# Patient Record
(demographics unavailable — no encounter records)

---

## 2024-12-09 NOTE — DISCUSSION/SUMMARY
[FreeTextEntry1] : Sandra has a history of mitral valve prolapse with mild MR. She has not displayed any clear evidence of LV or RV dysfunction, though years ago there was a suspicion for RV dysfunction based on MRI.  She was comprehensively evaluated, including with an opinion at [facility], and was told that she did not have RV dysplasia. In the end, she has done well with her defibrillator, which has never fired.  Echocardiography has revealed stable bileaflet prolapse with mild to moderate MR. Echocardiography was performed in August 2022.  Ejection fraction was 60%, with moderate mitral regurgitation.  Echocardiography was performed October 27, 2023.  This revealed an ejection fraction of 61% with a right ventricular aneurysm.  There was bileaflet prolapse with mild to moderate late systolic MR.  I would not make any changes to her medications at present.  She will schedule a follow-up echocardiogram to reassess the degree of mitral regurgitation.  I will be in contact with her to discuss the results.  We discussed the possibility of a stress test, but I think her dyspnea is likely functional.  I have requested her most recent blood work, and her most recent defibrillator interrogation for my review.  If all is well, she will see me in 1 year. [EKG obtained to assist in diagnosis and management of assessed problem(s)] : EKG obtained to assist in diagnosis and management of assessed problem(s)

## 2024-12-09 NOTE — REASON FOR VISIT
[Follow-Up - Clinic] : a clinic follow-up of [Cardiomyopathy] : cardiomyopathy [Mitral Regurgitation] : mitral regurgitation [Ventricular Tachycardia] : ventricular tachycardia [FreeTextEntry1] : Initiated at 9:35a

## 2024-12-09 NOTE — END OF VISIT
Visit Information Date & Time Provider Department Dept. Phone Encounter #  
 9/18/2017  9:15 AM Dolores Haji MD [facility] [phone] [id number] Your Appointments 1/4/2018  9:30 AM  
PHYSICAL with Dolores Haji MD  
[facility]) Appt Note: [facility] 4 yr  
 [address] [place] [id number]  
[phone] [address] Upcoming Health Maintenance Date Due INFLUENZA PEDS 6M-8Y (1 of 2) 8/1/2017 Varicella Peds Age 1-18 (2 of 2 - 2 Dose Childhood Series) 12/20/2017 IPV Peds Age 0-18 (4 of 4 - All-IPV Series) 12/20/2017 MMR Peds Age 1-18 (2 of 2) 12/20/2017 DTaP/Tdap/Td series (5 - DTaP) 12/20/2017 MCV through Age 25 (1 of 2) 12/20/2024 Allergies as of 9/18/2017  Review Complete On: 9/18/2017 By: Dolores Haji MD  
  
 Severity Noted Reaction Type Reactions Egg High 09/18/2017    Anaphylaxis Nut - Unspecified High 09/18/2017    Anaphylaxis Wheat High 09/18/2017    Anaphylaxis Current Immunizations  Reviewed on 6/19/2014 Name Date DTaP 3/20/2015, 6/19/2014, 4/29/2014 DTaP-Hep B-IPV 2/20/2014 Hep A Vaccine 2 Dose Schedule (Ped/Adol) 6/30/2015, 12/23/2014 Hep B Vaccine 2013 Hep B, Adol/Ped 9/23/2014 Hib (PRP-T) 3/20/2015, 6/19/2014, 4/29/2014, 2/20/2014 IPV 6/19/2014, 4/29/2014 MMR 12/23/2014 Pneumococcal Conjugate (PCV-13) 12/23/2014, 6/19/2014, 4/29/2014, 2/20/2014 Rotavirus, Live, Pentavalent Vaccine 6/19/2014, 4/29/2014, 2/20/2014 Varicella Virus Vaccine 12/23/2014 Not reviewed this visit You Were Diagnosed With   
  
 Codes Comments Bacterial skin infection of upper extremity    -  Primary ICD-10-CM: L08.9 ICD-9-CM: 686.9 Eczema, unspecified type     ICD-10-CM: L30.9 ICD-9-CM: 692.9 Nut allergy     ICD-10-CM: Z91.018 
ICD-9-CM: V15.05 Vitals Pulse Temp Height(growth percentile) Weight(growth percentile) SpO2 BMI  
 105 97.9 °F (36.6 °C) (Axillary) (!) 3' 0.25\" (0.921 m) (2 %, Z= -2.07)* 30 lb 12.8 oz (14 kg) (15 %, Z= -1.06)* 95% 16.48 kg/m2 (74 %, Z= 0.64)* Smoking Status Never Smoker *Growth percentiles are based on [facility] 2-20 Years data. Vitals History BMI and BSA Data Body Mass Index Body Surface Area  
 16.48 kg/m 2 0.6 m 2 Preferred Pharmacy Pharmacy Name Phone [address] [facility] AT [address] [place] [address]. [phone] Your Updated Medication List  
  
   
This list is accurate as of: 9/18/17 10:19 AM.  Always use your most recent med list.  
  
  
  
  
 augmented betamethasone dipropionate 0.05 % ointment Commonly known as:  DIPROLENE-AF  
0 Refills  
  
 cetirizine 1 mg/mL solution Commonly known as:  ZYRTEC  
GIVE \"WALESKA\" 2.5 MLS BY MOUTH DAILY doxepin 10 mg/mL solution Commonly known as:  SINEQUAN Take 10 mg by mouth nightly. EPINEPHrine 0.15 mg/0.3 mL injection Commonly known as:  EPIPEN JR  
0.3 mL by IntraMUSCular route once as needed for up to 1 dose. Indications: wheat, egs and all nuts  
  
 fluocinoNIDE 0.05 % ointment Commonly known as:  LIDEX  
  
 fluticasone 0.005 % ointment Commonly known as:  Manjit Serrano Apply  to affected area daily. apply thin layer as directed  
  
 hydrocortisone 2.5 % topical cream  
Commonly known as:  HYTONE  
  
 sulfamethoxazole-trimethoprim 200-40 mg/5 mL suspension Commonly known as:  BACTRIM;SEPTRA  
1.5 teaspoon twice daily for ten days TRI-VI-SOL 1,500- unit-mg-unit/mL Drop Generic drug:  Pediatric Vitamins ADC Take  by mouth.  
  
 triamcinolone acetonide 0.1 % ointment Commonly known as:  KENALOG Prescriptions Sent to Pharmacy  Refills  
 sulfamethoxazole-trimethoprim (BACTRIM;SEPTRA) 200-40 mg/5 mL suspension 0  
 Si.5 teaspoon twice daily for ten days Class: Normal  
 Pharmacy: [facility] [address] [place] [address], [address] [place] [address] RD AT [address] [place] [address]. Ph #: [phone] EPINEPHrine (EPIPEN JR) 0.15 mg/0.3 mL injection 0 Si.3 mL by IntraMUSCular route once as needed for up to 1 dose. Indications: wheat, egs and all nuts Class: Normal  
 Pharmacy: [facility] [address] [place] [address] AT [address] [place] [address]. Ph #: [phone] Route: IntraMUSCular Introducing [facility]! Dear Parent or Guardian, Thank you for requesting a MedRunner account for your child. With MedRunner, you can view your childs hospital or ER discharge instructions, current allergies, immunizations and much more. In order to access your childs information, we require a signed consent on file. Please see the [facility] department or call [phone] for instructions on completing a MedRunner Proxy request.   
Additional Information If you have questions, please visit the Frequently Asked Questions section of the MedRunner website at [email or web address] Remember, MedRunner is NOT to be used for urgent needs. For medical emergencies, dial 911. Now available from your iPhone and Android! Please provide this summary of care documentation to your next provider. Your primary care clinician is listed as NOT ON FILE. If you have any questions after today's visit, please call [phone]. [Time Spent: ___ minutes] : I have spent [unfilled] minutes of time on the encounter which excludes teaching and separately reported services.

## 2024-12-09 NOTE — PHYSICAL EXAM
[General Appearance - Well Developed] : well developed [Normal Appearance] : normal appearance [Well Groomed] : well groomed [General Appearance - Well Nourished] : well nourished [No Deformities] : no deformities [General Appearance - In No Acute Distress] : no acute distress [Normal Conjunctiva] : the conjunctiva exhibited no abnormalities [Eyelids - No Xanthelasma] : the eyelids demonstrated no xanthelasmas [Normal Oral Mucosa] : normal oral mucosa [No Oral Pallor] : no oral pallor [No Oral Cyanosis] : no oral cyanosis [Normal Jugular Venous A Waves Present] : normal jugular venous A waves present [Normal Jugular Venous V Waves Present] : normal jugular venous V waves present [No Jugular Venous Almaguer A Waves] : no jugular venous almaguer A waves [Respiration, Rhythm And Depth] : normal respiratory rhythm and effort [Exaggerated Use Of Accessory Muscles For Inspiration] : no accessory muscle use [Auscultation Breath Sounds / Voice Sounds] : lungs were clear to auscultation bilaterally [Abdomen Soft] : soft [Abdomen Tenderness] : non-tender [Abdomen Mass (___ Cm)] : no abdominal mass palpated [Abnormal Walk] : normal gait [Gait - Sufficient For Exercise Testing] : the gait was sufficient for exercise testing [Nail Clubbing] : no clubbing of the fingernails [Cyanosis, Localized] : no localized cyanosis [Petechial Hemorrhages (___cm)] : no petechial hemorrhages [Skin Color & Pigmentation] : normal skin color and pigmentation [] : no rash [No Venous Stasis] : no venous stasis [Skin Lesions] : no skin lesions [No Skin Ulcers] : no skin ulcer [No Xanthoma] : no  xanthoma was observed [Oriented To Time, Place, And Person] : oriented to person, place, and time [Affect] : the affect was normal [Mood] : the mood was normal [No Anxiety] : not feeling anxious

## 2024-12-09 NOTE — HISTORY OF PRESENT ILLNESS
[FreeTextEntry1] : Sandra Cisneros presented for a followup cardiovascular evaluation.  She was last seen in the office over a year ago.  She is now 50 years old, with a history of a cardiomyopathy, ventricular tachycardia, bradycardia and ICD implantation. She had previously had a Sprint Juan lead, which was removed, with another lead implanted.  She has a history of bileaflet mitral valve prolapse with mild MR.  Sandra has been feeling well.  She does not report symptoms suggestive of angina, heart failure or arrhythmia.  Her ICD has been followed at [facility], and apparently has been functioning well.  She is nearing the elective replacement interval, and plans on having the generator changed when the time is appropriate.  Her blood pressure has been checked regularly, and has been reproducibly normal.  She has been exercising fairly regularly, perhaps 4 or 5 days a week with some cross training and weightlifting.  She has been able to do this without difficulty. She gets some shortness of breath at times, especially when running up and down the stairs, but she feels that this might be related to conditioning.

## 2025-06-27 NOTE — PHYSICAL EXAM
[Chaperoned Physical Exam] : A chaperone was present in the examining room during all aspects of the physical examination. [Appropriately responsive] : appropriately responsive [Alert] : alert [No Acute Distress] : no acute distress [No Lymphadenopathy] : no lymphadenopathy [Soft] : soft [Non-tender] : non-tender [Non-distended] : non-distended [No HSM] : No HSM [No Lesions] : no lesions [No Mass] : no mass [Oriented x3] : oriented x3 [Examination Of The Breasts] : a normal appearance [No Masses] : no breast masses were palpable [Labia Majora] : normal [Labia Minora] : normal [Uterine Adnexae] : normal [Normal rectal exam] : was normal [Normal Brown Stool] : was normal and brown [Occult Blood Positive] : was positive for occult blood analysis [Normal] : was normal [None] : there was no rectal mass  [FreeTextEntry2] : This note was written by Vivienne Forrest actively solely DR. ELVIA M.D.   All medical record entries made by the Scribe were at my, DR. ELVIA M.D., direction and personally dictated by me during this visit. I have personally reviewed the chart and agree that the record reflects my personal performance of the history, physical exam, assessment, and plan.  [Internal Hemorrhoid] : no internal hemorrhoids were present [External Hemorrhoid] : no external hemorrhoids were present [Skin Tags] : no residual hemorrhoidal skin tags

## 2025-06-27 NOTE — PLAN
[FreeTextEntry1] : I have spent 40 minutes of time on this encounter. Greater than 50% of the face-to-face encounter time was spent on counseling and/or coordination of care for examination findings, differential, testing, management, and planning. 10 minutes were allotted to discussing the depression screening.    Yearly breast cancer screening with no current clinical or radiographic concerns. The patient was reminded regarding future well breast and general healthcare, breast cancer risk reduction, the importance of self-examination, and the need for follow-up. She was again reminded of the need to take Vit D3 2500 IU daily or to keep a Vit D level above 30, and Tumeric 1000 mg daily with Black Pepper. Plan continued yearly imaging and breast follow-up, sooner as needed. I counseled the patient on current recommendations to reduce breast cancer risk including but not inclusive to regular exercise 20-30 minutes 3-4 times a week, low-fat diet, limiting alcohol consumption, maintenance of ideal body weight, yearly imaging, and self-breast awareness. Questions answered. I encouraged in light of Covid 19, social distancing, frequent hand washing, and precautions to stay healthy.   1) Self breast exam instructions and mammography discussed with the patient. 2) Lipid profile assessment, TSH screening, fasting glucose testing, and vitamin D supplementation were discussed with the patient. 3) Maintain healthy weight. 4) Regular health maintenance with PCP. 5) Remain tobacco free. 6) Limit alcohol intake to less than 5 drinks per week. 7) Osteoporosis screening and colonoscopy screening. 8) Annual cholesterol screening. 9) Annual influenza vaccine. 10) The importance of routine physical activity was reviewed and a goal of 150 minutes of moderately vigorous exercise per week was endorsed.    Risks, benefits, and alternatives of hormone replacement therapy including but not limited to deep venous thromboembolism, cardiovascular disease, and breast cancer were discussed with the patient. The risks of unopposed estrogen and the recommendation for progestins if a uterus is present were discussed and all questions were answered.   Patient is aware of the positive occult blood analysis of the stool. She will have that followed up with her internist/gastroenterologist and is aware this is not necessarily cause for alarm, as it could be hemorrhoids or diet related. Pt reports that she would like to try hormone replacement therapy. Recommended to follow up in a few months for the hormone replacement as she is still menstruating.  All questions were answered.

## 2025-06-27 NOTE — PROCEDURE
[Cervical Pap Smear] : cervical Pap smear [Liquid Base] : liquid base [Tolerated Well] : the patient tolerated the procedure well [No Complications] : there were no complications [Pelvic Pain] : pelvic pain [F/U Abnormal US] : f/u abnormal ultrasound [Transvaginal Ultrasound] : transvaginal ultrasound

## 2025-06-27 NOTE — HISTORY OF PRESENT ILLNESS
[Y] : Positive pregnancy history [Hot Flashes] : hot flashes [Experiencing Menopausal Sxs] : experiencing menopausal symptoms [No] : Patient does not have concerns regarding sex [FreeTextEntry1] : Pt presents today for an annual exam. Pt is doing well. Pt reports that she has been experiencing abnormal menstrual cycles. We reviewed together in detail her past medical and surgical histories, allergies and medication usage, and social and family history. All questions were answered in easy-to-understand language.  [Mammogramdate] : 3/22/2024 [BreastSonogramDate] : 3/22/2024 [PapSmeardate] : 3/6/2024 [ColonoscopyDate] : 2019 [LMPDate] : 5/28/25